# Patient Record
Sex: FEMALE | Race: WHITE | NOT HISPANIC OR LATINO | Employment: OTHER | URBAN - METROPOLITAN AREA
[De-identification: names, ages, dates, MRNs, and addresses within clinical notes are randomized per-mention and may not be internally consistent; named-entity substitution may affect disease eponyms.]

---

## 2017-02-27 ENCOUNTER — LAB (OUTPATIENT)
Dept: ENDOCRINOLOGY | Age: 57
End: 2017-02-27

## 2017-02-27 DIAGNOSIS — H05.89 THYROID ASSOCIATED OPHTHALMOPATHY: ICD-10-CM

## 2017-02-27 DIAGNOSIS — E07.9 THYROID ASSOCIATED OPHTHALMOPATHY: ICD-10-CM

## 2017-02-27 DIAGNOSIS — E05.00 FLAJANI DISEASE: ICD-10-CM

## 2017-02-27 DIAGNOSIS — E89.0 HYPOTHYROIDISM FOLLOWING RADIOIODINE THERAPY: ICD-10-CM

## 2017-02-27 DIAGNOSIS — E55.9 VITAMIN D DEFICIENCY: ICD-10-CM

## 2017-02-27 DIAGNOSIS — E87.6 DECREASED POTASSIUM IN THE BLOOD: ICD-10-CM

## 2017-02-27 DIAGNOSIS — E55.9 VITAMIN D DEFICIENCY: Primary | ICD-10-CM

## 2017-02-27 DIAGNOSIS — M81.0 OSTEOPOROSIS, POST-MENOPAUSAL: ICD-10-CM

## 2017-02-28 LAB — 25(OH)D3+25(OH)D2 SERPL-MCNC: 33.1 NG/ML (ref 30–100)

## 2017-03-02 LAB
ACTH PLAS-MCNC: 14 PG/ML (ref 7.2–63.3)
ALBUMIN SERPL-MCNC: 5.4 G/DL (ref 3.5–5.2)
ALBUMIN/GLOB SERPL: 2 G/DL
ALP SERPL-CCNC: 106 U/L (ref 39–117)
ALT SERPL-CCNC: 20 U/L (ref 1–33)
AST SERPL-CCNC: 27 U/L (ref 1–32)
BILIRUB SERPL-MCNC: 0.8 MG/DL (ref 0.1–1.2)
BUN SERPL-MCNC: 11 MG/DL (ref 6–20)
BUN/CREAT SERPL: 13.3 (ref 7–25)
CALCIUM SERPL-MCNC: 10 MG/DL (ref 8.6–10.5)
CHLORIDE SERPL-SCNC: 91 MMOL/L (ref 98–107)
CHOLEST SERPL-MCNC: 187 MG/DL (ref 0–200)
CO2 SERPL-SCNC: 27.7 MMOL/L (ref 22–29)
CORTIS SERPL-MCNC: 12.9 UG/DL
CREAT SERPL-MCNC: 0.83 MG/DL (ref 0.57–1)
FSH SERPL-ACNC: 62.7 MIU/ML
GLOBULIN SER CALC-MCNC: 2.7 GM/DL
GLUCOSE SERPL-MCNC: 72 MG/DL (ref 65–99)
HDLC SERPL-MCNC: 107 MG/DL (ref 40–60)
LDLC SERPL CALC-MCNC: 68 MG/DL (ref 0–100)
LH SERPL-ACNC: 33.7 MIU/ML
POTASSIUM SERPL-SCNC: 4 MMOL/L (ref 3.5–5.2)
PROT SERPL-MCNC: 8.1 G/DL (ref 6–8.5)
SODIUM SERPL-SCNC: 132 MMOL/L (ref 136–145)
T3FREE SERPL-MCNC: 2.4 PG/ML (ref 2–4.4)
T4 FREE SERPL-MCNC: 1.83 NG/DL (ref 0.93–1.7)
T4 SERPL-MCNC: 8 MCG/DL (ref 4.5–11.7)
TRIGL SERPL-MCNC: 62 MG/DL (ref 0–150)
TSH SERPL DL<=0.005 MIU/L-ACNC: 0.38 MIU/ML (ref 0.27–4.2)
TSI ACT/NOR SER: 526 % (ref 0–139)
URATE SERPL-MCNC: 5.4 MG/DL (ref 2.4–5.7)
VLDLC SERPL CALC-MCNC: 12.4 MG/DL (ref 5–40)

## 2017-03-13 ENCOUNTER — OFFICE VISIT (OUTPATIENT)
Dept: ENDOCRINOLOGY | Age: 57
End: 2017-03-13

## 2017-03-13 VITALS
HEIGHT: 64 IN | BODY MASS INDEX: 21.92 KG/M2 | RESPIRATION RATE: 16 BRPM | DIASTOLIC BLOOD PRESSURE: 80 MMHG | SYSTOLIC BLOOD PRESSURE: 124 MMHG | WEIGHT: 128.4 LBS

## 2017-03-13 DIAGNOSIS — E05.00 FLAJANI DISEASE: ICD-10-CM

## 2017-03-13 DIAGNOSIS — M81.0 OSTEOPOROSIS, POST-MENOPAUSAL: ICD-10-CM

## 2017-03-13 DIAGNOSIS — E05.90 HYPERTHYROIDISM: Primary | ICD-10-CM

## 2017-03-13 DIAGNOSIS — E89.0 HYPOTHYROIDISM FOLLOWING RADIOIODINE THERAPY: ICD-10-CM

## 2017-03-13 PROCEDURE — 99214 OFFICE O/P EST MOD 30 MIN: CPT | Performed by: INTERNAL MEDICINE

## 2017-03-13 RX ORDER — SERTRALINE HYDROCHLORIDE 100 MG/1
100 TABLET, FILM COATED ORAL DAILY
COMMUNITY
End: 2022-01-13

## 2017-03-13 RX ORDER — LEVOTHYROXINE SODIUM 0.1 MG/1
100 TABLET ORAL DAILY
Qty: 30 TABLET | Refills: 5 | Status: SHIPPED | OUTPATIENT
Start: 2017-03-13 | End: 2018-03-05 | Stop reason: SDUPTHER

## 2017-03-13 RX ORDER — ERGOCALCIFEROL 1.25 MG/1
50000 CAPSULE ORAL 2 TIMES WEEKLY
Qty: 26 CAPSULE | Refills: 3 | Status: SHIPPED | OUTPATIENT
Start: 2017-03-13 | End: 2018-03-13

## 2017-03-13 NOTE — PROGRESS NOTES
"Subjective   Valentine Sawyer is a 57 y.o. female seen for follow up for hypothyroidism, graves disease, goiter, lab review. Patient denies any problems or concerns. She states that since her last office visit she has been put on continuous oxygen. She is also currently taking amoxil for a chest cold. She was diagnosed with Osteoporosis in 01/2017.    History of Present Illness this is a 57-year-old female known patient with history of Graves' disease disease and thyrotoxicosis a status post ablation and consequent hypothyroidism as well as essential hypertension and vitamin D deficiency.  Over the course of last the 6 months she has had no significant health problems however recently she has been diagnosed with post menopausal osteoporosi however she is not on any specific treatment and using the calcium and vitamin D supplements over-the-counter.  Visit Vitals   • /80   • Resp 16   • Ht 64\" (162.6 cm)   • Wt 128 lb 6.4 oz (58.2 kg)   • BMI 22.04 kg/m2     No Known Allergies    Current Outpatient Prescriptions:   •  albuterol (PROAIR RESPICLICK) 108 (90 BASE) MCG/ACT inhaler, Inhale Every 4 (Four) Hours As Needed for Wheezing., Disp: , Rfl:   •  aspirin 81 MG tablet, Take 81 mg by mouth daily., Disp: , Rfl:   •  bumetanide (BUMEX) 2 MG tablet, Take 2 mg by mouth daily., Disp: , Rfl:   •  CHANTIX 0.5 MG tablet, Take 1 tablet by mouth 2 (two) times a day., Disp: 60 tablet, Rfl: 0  •  CHANTIX CONTINUING MONTH RAHUL 1 MG tablet, Take 1 tablet by mouth 2 (two) times a day., Disp: 60 tablet, Rfl: 2  •  Fluticasone Furoate-Vilanterol (BREO ELLIPTA) 100-25 MCG/INH aerosol powder , Inhale., Disp: , Rfl:   •  levothyroxine (SYNTHROID, LEVOTHROID) 100 MCG tablet, Take 1 tablet by mouth daily., Disp: 30 tablet, Rfl: 5  •  lisinopril (PRINIVIL,ZESTRIL) 20 MG tablet, Take 1 tablet by mouth daily., Disp: 30 tablet, Rfl: 5  •  metoprolol succinate XL (TOPROL-XL) 50 MG 24 hr tablet, Take 50 mg by mouth 2 (two) times a day., Disp: " , Rfl:   •  potassium chloride (KLOR-CON) 8 MEQ CR tablet, Take 8 mEq by mouth 2 (two) times a day., Disp: , Rfl:   •  sertraline (ZOLOFT) 100 MG tablet, Take 100 mg by mouth Daily., Disp: , Rfl:   •  Umeclidinium Bromide (INCRUSE ELLIPTA) 62.5 MCG/INH aerosol powder , Inhale., Disp: , Rfl:       The following portions of the patient's history were reviewed and updated as appropriate: allergies, current medications, past family history, past medical history, past social history, past surgical history and problem list.    Review of Systems   Constitutional: Negative.    HENT: Negative.    Eyes: Negative.    Respiratory: Negative.    Cardiovascular: Negative.    Gastrointestinal: Negative.    Endocrine: Negative.    Genitourinary: Negative.    Musculoskeletal: Negative.    Skin: Negative.    Allergic/Immunologic: Negative.    Neurological: Negative.    Hematological: Negative.    Psychiatric/Behavioral: Negative.        Objective   Physical Exam   Constitutional: She is oriented to person, place, and time. She appears well-developed and well-nourished. No distress.   Chronic the ill and tired looking.   HENT:   Head: Normocephalic and atraumatic.   Right Ear: External ear normal.   Left Ear: External ear normal.   Nose: Nose normal.   Mouth/Throat: Oropharynx is clear and moist. No oropharyngeal exudate.   Eyes: Conjunctivae and EOM are normal. Pupils are equal, round, and reactive to light. Right eye exhibits no discharge. Left eye exhibits no discharge. No scleral icterus.   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   Neck: Normal range of motion. Neck supple. No JVD present. No tracheal deviation present. No thyromegaly present.   Cardiovascular: Normal rate, regular rhythm, normal heart sounds and intact distal pulses.  Exam reveals no gallop and no friction rub.    No murmur heard.  Pulmonary/Chest: Effort normal and breath sounds normal. No stridor. No respiratory distress. She has no wheezes. She has  no rales. She exhibits no tenderness.   Abdominal: Soft. Bowel sounds are normal. She exhibits no distension and no mass. There is no tenderness. There is no rebound and no guarding. No hernia.   Musculoskeletal: Normal range of motion. She exhibits no edema, tenderness or deformity.   Lymphadenopathy:     She has no cervical adenopathy.   Neurological: She is alert and oriented to person, place, and time. She has normal reflexes. She displays normal reflexes. No cranial nerve deficit. She exhibits normal muscle tone. Coordination normal.   Skin: Skin is warm and dry. No rash noted. She is not diaphoretic. No erythema. No pallor.   Psychiatric: She has a normal mood and affect. Her behavior is normal. Judgment and thought content normal.   Nursing note and vitals reviewed.    Results for orders placed or performed in visit on 02/27/17   Uric acid   Result Value Ref Range    Uric Acid 5.4 2.4 - 5.7 mg/dL   T4, free   Result Value Ref Range    Free T4 1.83 (H) 0.93 - 1.70 ng/dL   T4 and TSH ([facility] Only)   Result Value Ref Range    TSH 0.378 0.270 - 4.200 mIU/mL    T4, Total 8.00 4.50 - 11.70 mcg/dL   T3, free   Result Value Ref Range    T3, Free 2.4 2.0 - 4.4 pg/mL   Comprehensive metabolic panel   Result Value Ref Range    Glucose 72 65 - 99 mg/dL    BUN 11 6 - 20 mg/dL    Creatinine 0.83 0.57 - 1.00 mg/dL    eGFR Non African Am 71 >60 mL/min/1.73    eGFR African Am 86 >60 mL/min/1.73    BUN/Creatinine Ratio 13.3 7.0 - 25.0    Sodium 132 (L) 136 - 145 mmol/L    Potassium 4.0 3.5 - 5.2 mmol/L    Chloride 91 (L) 98 - 107 mmol/L    Total CO2 27.7 22.0 - 29.0 mmol/L    Calcium 10.0 8.6 - 10.5 mg/dL    Total Protein 8.1 6.0 - 8.5 g/dL    Albumin 5.40 (H) 3.50 - 5.20 g/dL    Globulin 2.7 gm/dL    A/G Ratio 2.0 g/dL    Total Bilirubin 0.8 0.1 - 1.2 mg/dL    Alkaline Phosphatase 106 39 - 117 U/L    AST (SGOT) 27 1 - 32 U/L    ALT (SGPT) 20 1 - 33 U/L   Follicle stimulating hormone   Result Value Ref Range    FSH 62.7  mIU/mL   Lipid panel   Result Value Ref Range    Total Cholesterol 187 0 - 200 mg/dL    Triglycerides 62 0 - 150 mg/dL    HDL Cholesterol 107 (H) 40 - 60 mg/dL    VLDL Cholesterol 12.4 5 - 40 mg/dL    LDL Cholesterol  68 0 - 100 mg/dL   Luteinizing hormone   Result Value Ref Range    LH 33.7 mIU/mL   ACTH   Result Value Ref Range    ACTH 14.0 7.2 - 63.3 pg/mL   Cortisol   Result Value Ref Range    Cortisol 12.9 ug/dL   Thyroid stimulating immunoglobulin   Result Value Ref Range    Thyroid Stimulating Immunoglobulin 526 (H) 0 - 139 %   Vitamin D 25 Hydroxy   Result Value Ref Range    25 Hydroxy, Vitamin D 33.1 30.0 - 100.0 ng/mL         Assessment/Plan   Diagnoses and all orders for this visit:    Hyperthyroidism  -     T3, Free; Future  -     T4 & TSH ([facility]); Future  -     T4, Free; Future  -     Uric Acid; Future  -     Vitamin D 25 Hydroxy; Future  -     Comprehensive Metabolic Panel; Future  -     Lipid Panel; Future  -     Thyroid Stimulating Immunoglobulin; Future    Hypothyroidism following radioiodine therapy  -     T3, Free; Future  -     T4 & TSH ([facility]); Future  -     T4, Free; Future  -     Uric Acid; Future  -     Vitamin D 25 Hydroxy; Future  -     Comprehensive Metabolic Panel; Future  -     Lipid Panel; Future  -     Thyroid Stimulating Immunoglobulin; Future    Osteoporosis, post-menopausal  -     T3, Free; Future  -     T4 & TSH ([facility]); Future  -     T4, Free; Future  -     Uric Acid; Future  -     Vitamin D 25 Hydroxy; Future  -     Comprehensive Metabolic Panel; Future  -     Lipid Panel; Future  -     Thyroid Stimulating Immunoglobulin; Future    Flajani disease  -     T3, Free; Future  -     T4 & TSH ([facility]); Future  -     T4, Free; Future  -     Uric Acid; Future  -     Vitamin D 25 Hydroxy; Future  -     Comprehensive Metabolic Panel; Future  -     Lipid Panel; Future  -     Thyroid Stimulating Immunoglobulin; Future    Other orders  -     levothyroxine (SYNTHROID, LEVOTHROID) 100  MCG tablet; Take 1 tablet by mouth Daily.  -     ergocalciferol (DRISDOL) 96520 UNITS capsule; Take 1 capsule by mouth 2 (Two) Times a Week.               His summary I saw and examined this 57-year-old female for above-mentioned problems.  I reviewed with her laboratory evaluation of 02/27/2017 and provided her with a hard copy of it.  She is overall clinically and metabolically stable however her vitamin D level still is on the low side and therefore am going to increase her dose to 50,000 units twice weekly.  She wishes to be follicle mostly by her primary care provider and as such I will see her once a year.

## 2017-05-09 RX ORDER — LISINOPRIL 20 MG/1
TABLET ORAL
Qty: 30 TABLET | Refills: 4 | Status: SHIPPED | OUTPATIENT
Start: 2017-05-09 | End: 2019-02-08 | Stop reason: CLARIF

## 2018-03-01 ENCOUNTER — RESULTS ENCOUNTER (OUTPATIENT)
Dept: ENDOCRINOLOGY | Age: 58
End: 2018-03-01

## 2018-03-01 DIAGNOSIS — E89.0 HYPOTHYROIDISM FOLLOWING RADIOIODINE THERAPY: ICD-10-CM

## 2018-03-01 DIAGNOSIS — E05.00 FLAJANI DISEASE: ICD-10-CM

## 2018-03-01 DIAGNOSIS — E05.90 HYPERTHYROIDISM: ICD-10-CM

## 2018-03-01 DIAGNOSIS — M81.0 OSTEOPOROSIS, POST-MENOPAUSAL: ICD-10-CM

## 2018-03-01 LAB
25(OH)D3+25(OH)D2 SERPL-MCNC: 80.5 NG/ML (ref 30–100)
ALBUMIN SERPL-MCNC: 5.2 G/DL (ref 3.5–5.2)
ALBUMIN/GLOB SERPL: 1.8 G/DL
ALP SERPL-CCNC: 107 U/L (ref 39–117)
ALT SERPL-CCNC: 15 U/L (ref 1–33)
AST SERPL-CCNC: 26 U/L (ref 1–32)
BILIRUB SERPL-MCNC: 0.9 MG/DL (ref 0.1–1.2)
BUN SERPL-MCNC: 12 MG/DL (ref 6–20)
BUN/CREAT SERPL: 13.3 (ref 7–25)
CALCIUM SERPL-MCNC: 9.8 MG/DL (ref 8.6–10.5)
CHLORIDE SERPL-SCNC: 89 MMOL/L (ref 98–107)
CHOLEST SERPL-MCNC: 208 MG/DL (ref 0–200)
CO2 SERPL-SCNC: 26.6 MMOL/L (ref 22–29)
CREAT SERPL-MCNC: 0.9 MG/DL (ref 0.57–1)
GFR SERPLBLD CREATININE-BSD FMLA CKD-EPI: 64 ML/MIN/1.73
GFR SERPLBLD CREATININE-BSD FMLA CKD-EPI: 78 ML/MIN/1.73
GLOBULIN SER CALC-MCNC: 2.9 GM/DL
GLUCOSE SERPL-MCNC: 90 MG/DL (ref 65–99)
HDLC SERPL-MCNC: 109 MG/DL (ref 40–60)
INTERPRETATION: NORMAL
LDLC SERPL CALC-MCNC: 87 MG/DL (ref 0–100)
POTASSIUM SERPL-SCNC: 3.9 MMOL/L (ref 3.5–5.2)
PROT SERPL-MCNC: 8.1 G/DL (ref 6–8.5)
SODIUM SERPL-SCNC: 131 MMOL/L (ref 136–145)
T3FREE SERPL-MCNC: 2.7 PG/ML (ref 2–4.4)
T4 FREE SERPL-MCNC: 1.83 NG/DL (ref 0.93–1.7)
T4 SERPL-MCNC: 8.17 MCG/DL (ref 4.5–11.7)
TRIGL SERPL-MCNC: 60 MG/DL (ref 0–150)
TSH SERPL DL<=0.005 MIU/L-ACNC: 0.68 MIU/ML (ref 0.27–4.2)
TSI ACT/NOR SER: 1.82 IU/L (ref 0–0.55)
URATE SERPL-MCNC: 5.3 MG/DL (ref 2.4–5.7)
VLDLC SERPL CALC-MCNC: 12 MG/DL (ref 5–40)

## 2018-03-06 RX ORDER — LEVOTHYROXINE SODIUM 0.1 MG/1
TABLET ORAL
Qty: 30 TABLET | Refills: 0 | Status: SHIPPED | OUTPATIENT
Start: 2018-03-06 | End: 2018-04-09 | Stop reason: SDUPTHER

## 2018-04-09 RX ORDER — LEVOTHYROXINE SODIUM 0.1 MG/1
TABLET ORAL
Qty: 30 TABLET | Refills: 0 | Status: SHIPPED | OUTPATIENT
Start: 2018-04-09 | End: 2018-05-15 | Stop reason: SDUPTHER

## 2018-05-15 RX ORDER — LEVOTHYROXINE SODIUM 0.1 MG/1
TABLET ORAL
Qty: 30 TABLET | Refills: 0 | Status: SHIPPED | OUTPATIENT
Start: 2018-05-15 | End: 2018-06-21 | Stop reason: SDUPTHER

## 2018-06-21 RX ORDER — LEVOTHYROXINE SODIUM 0.1 MG/1
TABLET ORAL
Qty: 30 TABLET | Refills: 0 | Status: SHIPPED | OUTPATIENT
Start: 2018-06-21 | End: 2018-07-30 | Stop reason: SDUPTHER

## 2018-07-31 RX ORDER — LEVOTHYROXINE SODIUM 0.1 MG/1
TABLET ORAL
Qty: 30 TABLET | Refills: 0 | Status: SHIPPED | OUTPATIENT
Start: 2018-07-31 | End: 2018-09-10 | Stop reason: SDUPTHER

## 2018-09-11 RX ORDER — LEVOTHYROXINE SODIUM 0.1 MG/1
TABLET ORAL
Qty: 30 TABLET | Refills: 0 | Status: SHIPPED | OUTPATIENT
Start: 2018-09-11 | End: 2018-10-22 | Stop reason: SDUPTHER

## 2018-09-13 DIAGNOSIS — E87.6 DECREASED POTASSIUM IN THE BLOOD: ICD-10-CM

## 2018-09-13 DIAGNOSIS — E07.9 THYROID ASSOCIATED OPHTHALMOPATHY: ICD-10-CM

## 2018-09-13 DIAGNOSIS — E05.00 FLAJANI DISEASE: Primary | ICD-10-CM

## 2018-09-13 DIAGNOSIS — H05.89 THYROID ASSOCIATED OPHTHALMOPATHY: ICD-10-CM

## 2018-09-13 DIAGNOSIS — E89.0 HYPOTHYROIDISM FOLLOWING RADIOIODINE THERAPY: ICD-10-CM

## 2018-09-13 DIAGNOSIS — E05.90 HYPERTHYROIDISM: ICD-10-CM

## 2018-10-13 ENCOUNTER — RESULTS ENCOUNTER (OUTPATIENT)
Dept: ENDOCRINOLOGY | Age: 58
End: 2018-10-13

## 2018-10-13 DIAGNOSIS — E07.9 THYROID ASSOCIATED OPHTHALMOPATHY: ICD-10-CM

## 2018-10-13 DIAGNOSIS — H05.89 THYROID ASSOCIATED OPHTHALMOPATHY: ICD-10-CM

## 2018-10-13 DIAGNOSIS — E87.6 DECREASED POTASSIUM IN THE BLOOD: ICD-10-CM

## 2018-10-13 DIAGNOSIS — E89.0 HYPOTHYROIDISM FOLLOWING RADIOIODINE THERAPY: ICD-10-CM

## 2018-10-13 DIAGNOSIS — E05.90 HYPERTHYROIDISM: ICD-10-CM

## 2018-10-13 DIAGNOSIS — E05.00 FLAJANI DISEASE: ICD-10-CM

## 2018-10-15 RX ORDER — LEVOTHYROXINE SODIUM 0.1 MG/1
TABLET ORAL
Qty: 30 TABLET | Refills: 0 | OUTPATIENT
Start: 2018-10-15

## 2018-10-22 RX ORDER — LEVOTHYROXINE SODIUM 0.1 MG/1
100 TABLET ORAL DAILY
Qty: 30 TABLET | Refills: 0 | Status: SHIPPED | OUTPATIENT
Start: 2018-10-22 | End: 2018-12-01 | Stop reason: SDUPTHER

## 2018-11-26 RX ORDER — LEVOTHYROXINE SODIUM 0.1 MG/1
TABLET ORAL
Qty: 30 TABLET | Refills: 0 | OUTPATIENT
Start: 2018-11-26

## 2018-12-03 RX ORDER — LEVOTHYROXINE SODIUM 0.1 MG/1
TABLET ORAL
Qty: 30 TABLET | Refills: 0 | Status: SHIPPED | OUTPATIENT
Start: 2018-12-03 | End: 2019-01-04 | Stop reason: SDUPTHER

## 2019-01-09 RX ORDER — LEVOTHYROXINE SODIUM 0.1 MG/1
TABLET ORAL
Qty: 30 TABLET | Refills: 0 | Status: SHIPPED | OUTPATIENT
Start: 2019-01-09 | End: 2019-02-08

## 2019-01-31 LAB
25(OH)D3+25(OH)D2 SERPL-MCNC: 36 NG/ML (ref 30–100)
ACTH PLAS-MCNC: 30.7 PG/ML (ref 7.2–63.3)
ALBUMIN SERPL-MCNC: 5.1 G/DL (ref 3.5–5.2)
ALBUMIN/GLOB SERPL: 1.7 G/DL
ALP SERPL-CCNC: 94 U/L (ref 39–117)
ALT SERPL-CCNC: 10 U/L (ref 1–33)
AST SERPL-CCNC: 17 U/L (ref 1–32)
BILIRUB SERPL-MCNC: 0.5 MG/DL (ref 0.1–1.2)
BUN SERPL-MCNC: 9 MG/DL (ref 6–20)
BUN/CREAT SERPL: 11.7 (ref 7–25)
CALCIUM SERPL-MCNC: 9.9 MG/DL (ref 8.6–10.5)
CHLORIDE SERPL-SCNC: 83 MMOL/L (ref 98–107)
CHOLEST SERPL-MCNC: 187 MG/DL (ref 0–200)
CO2 SERPL-SCNC: 24.5 MMOL/L (ref 22–29)
CORTIS SERPL-MCNC: 12 UG/DL
CREAT SERPL-MCNC: 0.77 MG/DL (ref 0.57–1)
FSH SERPL-ACNC: 62.3 MIU/ML
GLOBULIN SER CALC-MCNC: 3 GM/DL
GLUCOSE SERPL-MCNC: 73 MG/DL (ref 65–99)
HDLC SERPL-MCNC: 104 MG/DL (ref 40–60)
INTERPRETATION: NORMAL
LDLC SERPL CALC-MCNC: 58 MG/DL (ref 0–100)
LH SERPL-ACNC: 30.6 MIU/ML
POTASSIUM SERPL-SCNC: 4.6 MMOL/L (ref 3.5–5.2)
PROT SERPL-MCNC: 8.1 G/DL (ref 6–8.5)
SODIUM SERPL-SCNC: 125 MMOL/L (ref 136–145)
T3FREE SERPL-MCNC: 2.3 PG/ML (ref 2–4.4)
T4 FREE SERPL-MCNC: 1.67 NG/DL (ref 0.93–1.7)
T4 SERPL-MCNC: 7.75 MCG/DL (ref 4.5–11.7)
THYROGLOB AB SERPL-ACNC: 5.4 IU/ML
THYROGLOB SERPL-MCNC: 6.1 NG/ML
THYROGLOB SERPL-MCNC: ABNORMAL NG/ML
TRIGL SERPL-MCNC: 124 MG/DL (ref 0–150)
TSH SERPL DL<=0.005 MIU/L-ACNC: 2.26 MIU/ML (ref 0.27–4.2)
URATE SERPL-MCNC: 4.8 MG/DL (ref 2.4–5.7)
VLDLC SERPL CALC-MCNC: 24.8 MG/DL (ref 5–40)

## 2019-02-08 ENCOUNTER — OFFICE VISIT (OUTPATIENT)
Dept: ENDOCRINOLOGY | Age: 59
End: 2019-02-08

## 2019-02-08 VITALS
HEIGHT: 64 IN | DIASTOLIC BLOOD PRESSURE: 78 MMHG | SYSTOLIC BLOOD PRESSURE: 126 MMHG | BODY MASS INDEX: 23.08 KG/M2 | RESPIRATION RATE: 16 BRPM | WEIGHT: 135.2 LBS

## 2019-02-08 DIAGNOSIS — E05.00 FLAJANI DISEASE: ICD-10-CM

## 2019-02-08 DIAGNOSIS — E89.0 HYPOTHYROIDISM FOLLOWING RADIOIODINE THERAPY: Primary | ICD-10-CM

## 2019-02-08 DIAGNOSIS — H05.89 THYROID ASSOCIATED OPHTHALMOPATHY: ICD-10-CM

## 2019-02-08 DIAGNOSIS — M81.0 OSTEOPOROSIS, POST-MENOPAUSAL: ICD-10-CM

## 2019-02-08 DIAGNOSIS — E05.90 HYPERTHYROIDISM: ICD-10-CM

## 2019-02-08 DIAGNOSIS — E07.9 THYROID ASSOCIATED OPHTHALMOPATHY: ICD-10-CM

## 2019-02-08 DIAGNOSIS — E87.1 HYPONATREMIA: ICD-10-CM

## 2019-02-08 PROCEDURE — 99214 OFFICE O/P EST MOD 30 MIN: CPT | Performed by: INTERNAL MEDICINE

## 2019-02-08 RX ORDER — MONTELUKAST SODIUM 10 MG/1
10 TABLET ORAL NIGHTLY
COMMUNITY

## 2019-02-08 RX ORDER — LEVOTHYROXINE SODIUM 0.05 MG/1
50 TABLET ORAL DAILY
Qty: 90 TABLET | Refills: 3 | Status: SHIPPED | OUTPATIENT
Start: 2019-02-08 | End: 2019-02-08

## 2019-02-08 RX ORDER — LOSARTAN POTASSIUM 25 MG/1
25 TABLET ORAL DAILY
COMMUNITY
End: 2022-01-13

## 2019-02-08 RX ORDER — LEVOTHYROXINE SODIUM 112 UG/1
112 TABLET ORAL DAILY
Qty: 90 TABLET | Refills: 3 | Status: SHIPPED | OUTPATIENT
Start: 2019-02-08 | End: 2019-08-26 | Stop reason: SDUPTHER

## 2019-02-08 NOTE — PROGRESS NOTES
"Subjective   Valentine Sawyer is a 59 y.o. female seen for follow up for goiter, hypothyroidism, graves, lab review. She states that she has had 2 falls since her last visit. She hurt her back and broke her wrist. She states that now she is having sciatic pain. She denies any other problems or concerns.     History of Present Illness this is a 59-year-old female known patient with history of Graves' disease and off tomorrow but the as well as post radioactive iodine therapy hypothyroidism.  She is complaining of pain in her sciatic area which makes even movement a very difficult action.  She has had no significant health problem for which to go to the emergency room or hospital.    /78   Resp 16   Ht 162.6 cm (64\")   Wt 61.3 kg (135 lb 3.2 oz)   BMI 23.21 kg/m²      No Known Allergies    Current Outpatient Medications:   •  albuterol (PROAIR RESPICLICK) 108 (90 BASE) MCG/ACT inhaler, Inhale Every 4 (Four) Hours As Needed for Wheezing., Disp: , Rfl:   •  aspirin 81 MG tablet, Take 81 mg by mouth daily., Disp: , Rfl:   •  bumetanide (BUMEX) 2 MG tablet, Take 2 mg by mouth daily., Disp: , Rfl:   •  Fluticasone Furoate-Vilanterol (BREO ELLIPTA) 100-25 MCG/INH aerosol powder , Inhale., Disp: , Rfl:   •  levothyroxine (SYNTHROID, LEVOTHROID) 100 MCG tablet, TAKE ONE TABLET BY MOUTH DAILY, Disp: 30 tablet, Rfl: 0  •  losartan (COZAAR) 25 MG tablet, Take 25 mg by mouth Daily., Disp: , Rfl:   •  metoprolol succinate XL (TOPROL-XL) 50 MG 24 hr tablet, Take 50 mg by mouth 2 (two) times a day., Disp: , Rfl:   •  montelukast (SINGULAIR) 10 MG tablet, Take 10 mg by mouth Every Night., Disp: , Rfl:   •  sertraline (ZOLOFT) 100 MG tablet, Take 100 mg by mouth Daily., Disp: , Rfl:   •  Umeclidinium Bromide (INCRUSE ELLIPTA) 62.5 MCG/INH aerosol powder , Inhale., Disp: , Rfl:       The following portions of the patient's history were reviewed and updated as appropriate: allergies, current medications, past family history, " past medical history, past social history, past surgical history and problem list.    Review of Systems   Constitutional: Negative.    HENT: Negative.    Eyes: Negative.    Respiratory: Negative.    Cardiovascular: Negative.    Gastrointestinal: Negative.    Endocrine: Negative.    Genitourinary: Negative.    Musculoskeletal: Negative.    Skin: Negative.    Allergic/Immunologic: Negative.    Neurological: Negative.    Hematological: Negative.    Psychiatric/Behavioral: Negative.        Objective   Physical Exam   Constitutional: She is oriented to person, place, and time. She appears well-developed and well-nourished. No distress.   Chronic the ill and tired looking.   HENT:   Head: Normocephalic and atraumatic.   Right Ear: External ear normal.   Left Ear: External ear normal.   Nose: Nose normal.   Mouth/Throat: Oropharynx is clear and moist. No oropharyngeal exudate.   Eyes: Conjunctivae and EOM are normal. Pupils are equal, round, and reactive to light. Right eye exhibits no discharge. Left eye exhibits no discharge. No scleral icterus.   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   Neck: Normal range of motion. Neck supple. No JVD present. No tracheal deviation present. No thyromegaly present.   Cardiovascular: Normal rate, regular rhythm, normal heart sounds and intact distal pulses. Exam reveals no gallop and no friction rub.   No murmur heard.  Pulmonary/Chest: Effort normal and breath sounds normal. No stridor. No respiratory distress. She has no wheezes. She has no rales. She exhibits no tenderness.   Abdominal: Soft. Bowel sounds are normal. She exhibits no distension and no mass. There is no tenderness. There is no rebound and no guarding. No hernia.   Musculoskeletal: Normal range of motion. She exhibits no edema, tenderness or deformity.   Lymphadenopathy:     She has no cervical adenopathy.   Neurological: She is alert and oriented to person, place, and time. She has normal reflexes. She  displays normal reflexes. No cranial nerve deficit or sensory deficit. She exhibits normal muscle tone. Coordination normal.   Skin: Skin is warm and dry. No rash noted. She is not diaphoretic. No erythema. No pallor.   Psychiatric: She has a normal mood and affect. Her behavior is normal. Judgment and thought content normal.   Nursing note and vitals reviewed.       Results for orders placed or performed in visit on 10/13/18   ACTH   Result Value Ref Range    ACTH 30.7 7.2 - 63.3 pg/mL   Cortisol   Result Value Ref Range    Cortisol 12.0 ug/dL   Follicle Stimulating Hormone   Result Value Ref Range    FSH 62.3 mIU/mL   Luteinizing Hormone   Result Value Ref Range    LH 30.6 mIU/mL   Comprehensive Metabolic Panel   Result Value Ref Range    Glucose 73 65 - 99 mg/dL    BUN 9 6 - 20 mg/dL    Creatinine 0.77 0.57 - 1.00 mg/dL    eGFR Non African Am 77 >60 mL/min/1.73    eGFR African Am 93 >60 mL/min/1.73    BUN/Creatinine Ratio 11.7 7.0 - 25.0    Sodium 125 (L) 136 - 145 mmol/L    Potassium 4.6 3.5 - 5.2 mmol/L    Chloride 83 (L) 98 - 107 mmol/L    Total CO2 24.5 22.0 - 29.0 mmol/L    Calcium 9.9 8.6 - 10.5 mg/dL    Total Protein 8.1 6.0 - 8.5 g/dL    Albumin 5.10 3.50 - 5.20 g/dL    Globulin 3.0 gm/dL    A/G Ratio 1.7 g/dL    Total Bilirubin 0.5 0.1 - 1.2 mg/dL    Alkaline Phosphatase 94 39 - 117 U/L    AST (SGOT) 17 1 - 32 U/L    ALT (SGPT) 10 1 - 33 U/L   Comprehensive Thyroglobulin   Result Value Ref Range    Thyroglobulin Ab 5.4 (H) IU/mL    Thyroglobulin Comment ng/mL    Thyroglobulin (TG-CHAY) 6.1 ng/mL   Lipid Panel   Result Value Ref Range    Total Cholesterol 187 0 - 200 mg/dL    Triglycerides 124 0 - 150 mg/dL    HDL Cholesterol 104 (H) 40 - 60 mg/dL    VLDL Cholesterol 24.8 5 - 40 mg/dL    LDL Cholesterol  58 0 - 100 mg/dL   Uric Acid   Result Value Ref Range    Uric Acid 4.8 2.4 - 5.7 mg/dL   Vitamin D 25 Hydroxy   Result Value Ref Range    25 Hydroxy, Vitamin D 36.0 30.0 - 100.0 ng/ml   T4, Free   Result  Value Ref Range    Free T4 1.67 0.93 - 1.70 ng/dL   T4 & TSH ([facility])   Result Value Ref Range    TSH 2.260 0.270 - 4.200 mIU/mL    T4, Total 7.75 4.50 - 11.70 mcg/dL   T3, Free   Result Value Ref Range    T3, Free 2.3 2.0 - 4.4 pg/mL   Cardiovascular Risk Assessment   Result Value Ref Range    Interpretation Note          Assessment/Plan   Diagnoses and all orders for this visit:    Hypothyroidism following radioiodine therapy  -     T3, Free; Future  -     T4 & TSH ([facility]); Future  -     T4, Free; Future  -     Uric Acid; Future  -     Vitamin D 25 Hydroxy; Future  -     Comprehensive Metabolic Panel; Future  -     Lipid Panel; Future  -     Thyroid Stimulating Immunoglobulin; Future  -     ACTH; Future  -     Cortisol; Future  -     Aldosterone; Future  -     Osmolality, Serum; Future  -     Osmolality, Urine - Urine, Clean Catch; Future    Hyperthyroidism  -     T3, Free; Future  -     T4 & TSH ([facility]); Future  -     T4, Free; Future  -     Uric Acid; Future  -     Vitamin D 25 Hydroxy; Future  -     Comprehensive Metabolic Panel; Future  -     Lipid Panel; Future  -     Thyroid Stimulating Immunoglobulin; Future  -     ACTH; Future  -     Cortisol; Future  -     Aldosterone; Future  -     Osmolality, Serum; Future  -     Osmolality, Urine - Urine, Clean Catch; Future    Flajani disease  -     T3, Free; Future  -     T4 & TSH ([facility]); Future  -     T4, Free; Future  -     Uric Acid; Future  -     Vitamin D 25 Hydroxy; Future  -     Comprehensive Metabolic Panel; Future  -     Lipid Panel; Future  -     Thyroid Stimulating Immunoglobulin; Future  -     ACTH; Future  -     Cortisol; Future  -     Aldosterone; Future  -     Osmolality, Serum; Future  -     Osmolality, Urine - Urine, Clean Catch; Future    Osteoporosis, post-menopausal  -     T3, Free; Future  -     T4 & TSH ([facility]); Future  -     T4, Free; Future  -     Uric Acid; Future  -     Vitamin D 25 Hydroxy; Future  -     Comprehensive Metabolic  Panel; Future  -     Lipid Panel; Future  -     Thyroid Stimulating Immunoglobulin; Future  -     ACTH; Future  -     Cortisol; Future  -     Aldosterone; Future  -     Osmolality, Serum; Future  -     Osmolality, Urine - Urine, Clean Catch; Future    Thyroid associated ophthalmopathy  -     T3, Free; Future  -     T4 & TSH ([facility]); Future  -     T4, Free; Future  -     Uric Acid; Future  -     Vitamin D 25 Hydroxy; Future  -     Comprehensive Metabolic Panel; Future  -     Lipid Panel; Future  -     Thyroid Stimulating Immunoglobulin; Future  -     ACTH; Future  -     Cortisol; Future  -     Aldosterone; Future  -     Osmolality, Serum; Future  -     Osmolality, Urine - Urine, Clean Catch; Future    Hyponatremia  -     ACTH; Future  -     Cortisol; Future  -     Aldosterone; Future  -     Osmolality, Serum; Future  -     Osmolality, Urine - Urine, Clean Catch; Future    Other orders  -     Discontinue: levothyroxine (SYNTHROID) 50 MCG tablet; Take 1 tablet by mouth Daily.  -     levothyroxine (SYNTHROID) 112 MCG tablet; Take 1 tablet by mouth Daily. On empty stomach               In summary I saw and examined this 59-year-old female for above-mentioned problems.  I reviewed her laboratory evaluation of 01/25/2019 and provided her with a hard copy of it.  Her FSH LH is and menopausal arrange however she is a former smoker as well as her mother had breast cancer and therefore she is not a candidate for hormone replacement therapy.  Overall she is clinically and metabolically stable and therefore we will go ahead and continue all her current prescriptions.  I will however increase her Synthroid to 112 µg daily.  She will see Ms. Regla Mahmood in 6 months or sooner if needed with laboratory evaluation prior to each office visit.

## 2019-02-12 ENCOUNTER — TELEPHONE (OUTPATIENT)
Dept: ENDOCRINOLOGY | Age: 59
End: 2019-02-12

## 2019-02-12 NOTE — TELEPHONE ENCOUNTER
----- Message from Aristides Armstrong MD sent at 2/11/2019  2:54 PM EST -----  Contact: [phone]  I did switch her to Synthroid 112 µg daily and is stopped the 100 µg daily.  ----- Message -----  From: Viky Sheridan MA  Sent: 2/11/2019  12:49 PM  To: Aristides Armstrong MD     Which dose is correct?  ----- Message -----  From: Ayala Alexander  Sent: 2/11/2019   8:42 AM  To: Viky Sheridan MA    levothyroxine (SYNTHROID) 112 MCG tablet    The above was sent over on Friday    However the patient states that brando told her to keep it the same which was 100    Please call patient to verify    She is expecting a return call    You can leave a message if she doesn't answer    She did not pick it up at the pharmacy        Spoke to patient. She expressed understanding.

## 2019-07-05 ENCOUNTER — TELEPHONE (OUTPATIENT)
Dept: ENDOCRINOLOGY | Age: 59
End: 2019-07-05

## 2019-07-05 NOTE — TELEPHONE ENCOUNTER
----- Message from Aristides Armstrong MD sent at 7/5/2019 10:59 AM EDT -----  Contact: PATIENT  I have no objection and is totally okay  ----- Message -----  From: Viky Sheridan MA  Sent: 7/5/2019  10:36 AM  To: Aristides Armstrong MD        ----- Message -----  From: Ayala Alexander  Sent: 7/5/2019   9:48 AM  To: Viky Sheridan MA    NEEDS TO KNOW IF IT IS OK IF SHE HAS AN EPIDUREAL BLOCK IN THE BACK    PAIN MANAGEMENT [facility] SUGGEST SHE HAS IT    IS IT OK FOR HER TO DO     PLEASE CALL PATIENT [phone]    SPOKE TO PATIENT. SHE EXPRESSED UNDERSTANDING. 07/05/2019

## 2019-07-30 ENCOUNTER — RESULTS ENCOUNTER (OUTPATIENT)
Dept: ENDOCRINOLOGY | Age: 59
End: 2019-07-30

## 2019-07-30 DIAGNOSIS — E87.1 HYPONATREMIA: ICD-10-CM

## 2019-07-30 DIAGNOSIS — E89.0 HYPOTHYROIDISM FOLLOWING RADIOIODINE THERAPY: ICD-10-CM

## 2019-07-30 DIAGNOSIS — M81.0 OSTEOPOROSIS, POST-MENOPAUSAL: ICD-10-CM

## 2019-07-30 DIAGNOSIS — H05.89 THYROID ASSOCIATED OPHTHALMOPATHY: ICD-10-CM

## 2019-07-30 DIAGNOSIS — E05.00 FLAJANI DISEASE: ICD-10-CM

## 2019-07-30 DIAGNOSIS — E05.90 HYPERTHYROIDISM: ICD-10-CM

## 2019-07-30 DIAGNOSIS — E07.9 THYROID ASSOCIATED OPHTHALMOPATHY: ICD-10-CM

## 2019-07-31 ENCOUNTER — RESULTS ENCOUNTER (OUTPATIENT)
Dept: ENDOCRINOLOGY | Age: 59
End: 2019-07-31

## 2019-07-31 DIAGNOSIS — H05.89 THYROID ASSOCIATED OPHTHALMOPATHY: ICD-10-CM

## 2019-07-31 DIAGNOSIS — E05.00 FLAJANI DISEASE: ICD-10-CM

## 2019-07-31 DIAGNOSIS — E05.90 HYPERTHYROIDISM: ICD-10-CM

## 2019-07-31 DIAGNOSIS — M81.0 OSTEOPOROSIS, POST-MENOPAUSAL: ICD-10-CM

## 2019-07-31 DIAGNOSIS — E89.0 HYPOTHYROIDISM FOLLOWING RADIOIODINE THERAPY: ICD-10-CM

## 2019-07-31 DIAGNOSIS — E07.9 THYROID ASSOCIATED OPHTHALMOPATHY: ICD-10-CM

## 2019-08-12 DIAGNOSIS — E89.0 HYPOTHYROIDISM FOLLOWING RADIOIODINE THERAPY: Primary | ICD-10-CM

## 2019-08-12 DIAGNOSIS — E55.9 VITAMIN D DEFICIENCY: ICD-10-CM

## 2019-08-15 ENCOUNTER — LAB (OUTPATIENT)
Dept: ENDOCRINOLOGY | Age: 59
End: 2019-08-15

## 2019-08-15 DIAGNOSIS — E89.0 HYPOTHYROIDISM FOLLOWING RADIOIODINE THERAPY: ICD-10-CM

## 2019-08-15 DIAGNOSIS — E55.9 VITAMIN D DEFICIENCY: ICD-10-CM

## 2019-08-24 LAB
25(OH)D3+25(OH)D2 SERPL-MCNC: 43.2 NG/ML (ref 30–100)
ALBUMIN SERPL-MCNC: 4.6 G/DL (ref 3.5–5.2)
ALBUMIN/GLOB SERPL: 1.9 G/DL
ALP SERPL-CCNC: 89 U/L (ref 39–117)
ALT SERPL-CCNC: 25 U/L (ref 1–33)
AST SERPL-CCNC: 26 U/L (ref 1–32)
BILIRUB SERPL-MCNC: 0.3 MG/DL (ref 0.2–1.2)
BUN SERPL-MCNC: 7 MG/DL (ref 6–20)
BUN/CREAT SERPL: 12.5 (ref 7–25)
CALCIUM SERPL-MCNC: 9.3 MG/DL (ref 8.6–10.5)
CHLORIDE SERPL-SCNC: 86 MMOL/L (ref 98–107)
CHOLEST SERPL-MCNC: 165 MG/DL (ref 0–200)
CO2 SERPL-SCNC: 22.5 MMOL/L (ref 22–29)
CREAT SERPL-MCNC: 0.56 MG/DL (ref 0.57–1)
FT4I SERPL CALC-MCNC: 2.3 (ref 1.2–4.9)
GLOBULIN SER CALC-MCNC: 2.4 GM/DL
GLUCOSE SERPL-MCNC: 81 MG/DL (ref 65–99)
HDLC SERPL-MCNC: 100 MG/DL (ref 40–60)
INTERPRETATION: NORMAL
LDLC SERPL CALC-MCNC: 52 MG/DL (ref 0–100)
POTASSIUM SERPL-SCNC: 4 MMOL/L (ref 3.5–5.2)
PROT SERPL-MCNC: 7 G/DL (ref 6–8.5)
SODIUM SERPL-SCNC: 126 MMOL/L (ref 136–145)
T3FREE SERPL-MCNC: 2 PG/ML (ref 2–4.4)
T3RU NFR SERPL: 32 % (ref 24–39)
T4 FREE SERPL-MCNC: 1.85 NG/DL (ref 0.93–1.7)
T4 SERPL-MCNC: 7.3 UG/DL (ref 4.5–12)
THYROGLOB AB SERPL-ACNC: 3.7 IU/ML
THYROGLOB SERPL-MCNC: 2.8 NG/ML
THYROGLOB SERPL-MCNC: ABNORMAL NG/ML
TRIGL SERPL-MCNC: 67 MG/DL (ref 0–150)
TSH SERPL DL<=0.005 MIU/L-ACNC: 0.67 UIU/ML (ref 0.45–4.5)
VLDLC SERPL CALC-MCNC: 13.4 MG/DL

## 2019-08-26 ENCOUNTER — OFFICE VISIT (OUTPATIENT)
Dept: ENDOCRINOLOGY | Age: 59
End: 2019-08-26

## 2019-08-26 VITALS
HEIGHT: 64 IN | WEIGHT: 128 LBS | SYSTOLIC BLOOD PRESSURE: 122 MMHG | BODY MASS INDEX: 21.85 KG/M2 | DIASTOLIC BLOOD PRESSURE: 72 MMHG

## 2019-08-26 DIAGNOSIS — H05.89 THYROID ASSOCIATED OPHTHALMOPATHY: ICD-10-CM

## 2019-08-26 DIAGNOSIS — E07.9 THYROID ASSOCIATED OPHTHALMOPATHY: ICD-10-CM

## 2019-08-26 DIAGNOSIS — E89.0 HYPOTHYROIDISM FOLLOWING RADIOIODINE THERAPY: Primary | ICD-10-CM

## 2019-08-26 PROCEDURE — 99213 OFFICE O/P EST LOW 20 MIN: CPT | Performed by: NURSE PRACTITIONER

## 2019-08-26 RX ORDER — LEVOTHYROXINE SODIUM 112 UG/1
112 TABLET ORAL DAILY
Qty: 90 TABLET | Refills: 3 | Status: SHIPPED | OUTPATIENT
Start: 2019-08-26 | End: 2020-09-01

## 2019-08-26 RX ORDER — GABAPENTIN 100 MG/1
100 CAPSULE ORAL 3 TIMES DAILY
COMMUNITY
End: 2022-01-13

## 2019-08-26 RX ORDER — HYDROCODONE BITARTRATE AND ACETAMINOPHEN 5; 325 MG/1; MG/1
1 TABLET ORAL EVERY 6 HOURS PRN
COMMUNITY
End: 2022-01-13

## 2019-08-26 NOTE — PROGRESS NOTES
"Venkatesh Sawyer is a 59 y.o. female is here today for follow-up.  Chief Complaint   Patient presents with   • Hypothyroidism     recent labs     /72   Ht 162.6 cm (64\")   Wt 58.1 kg (128 lb)   BMI 21.97 kg/m²   Current Outpatient Medications on File Prior to Visit   Medication Sig   • albuterol (PROAIR RESPICLICK) 108 (90 BASE) MCG/ACT inhaler Inhale Every 4 (Four) Hours As Needed for Wheezing.   • aspirin 81 MG tablet Take 81 mg by mouth daily.   • bumetanide (BUMEX) 2 MG tablet Take 2 mg by mouth daily.   • Fluticasone Furoate-Vilanterol (BREO ELLIPTA) 100-25 MCG/INH aerosol powder  Inhale.   • gabapentin (NEURONTIN) 100 MG capsule Take 100 mg by mouth 3 (Three) Times a Day.   • HYDROcodone-acetaminophen (NORCO) 5-325 MG per tablet Take 1 tablet by mouth Every 6 (Six) Hours As Needed.   • levothyroxine (SYNTHROID) 112 MCG tablet Take 1 tablet by mouth Daily. On empty stomach   • losartan (COZAAR) 25 MG tablet Take 25 mg by mouth Daily.   • metoprolol succinate XL (TOPROL-XL) 50 MG 24 hr tablet Take 50 mg by mouth 2 (two) times a day.   • montelukast (SINGULAIR) 10 MG tablet Take 10 mg by mouth Every Night.   • sertraline (ZOLOFT) 100 MG tablet Take 100 mg by mouth Daily.   • Umeclidinium Bromide (INCRUSE ELLIPTA) 62.5 MCG/INH aerosol powder  Inhale.     No current facility-administered medications on file prior to visit.      Family History   Problem Relation Age of Onset   • Thyroid disease Mother    • Diabetes Father    • Hypertension Father      Social History     Tobacco Use   • Smoking status: Current Every Day Smoker   Substance Use Topics   • Alcohol use: No   • Drug use: Not on file     No Known Allergies      History of Present Illness  Encounter Diagnoses   Name Primary?   • Hypothyroidism following radioiodine therapy Yes   • Thyroid associated ophthalmopathy    59-year-old female patient here today for a follow-up visit.  She is being seen for the above diagnoses.  She is taking her " thyroid medication daily consistently as prescribed.  She denies any signs and symptoms of hyper or hypothyroidism.  She does have complaints of chronic fatigue.      The following portions of the patient's history were reviewed and updated as appropriate: allergies, current medications, past family history, past medical history, past social history, past surgical history and problem list.    Review of Systems   Constitutional: Positive for fatigue.   Eyes: Negative.    Cardiovascular: Negative.    Endocrine: Negative.    Musculoskeletal: Positive for gait problem.   Skin: Negative.        Objective   Physical Exam   Constitutional: She is oriented to person, place, and time. She appears well-developed and well-nourished. No distress.   Chronic the ill and tired looking.   HENT:   Head: Normocephalic and atraumatic.   Right Ear: External ear normal.   Left Ear: External ear normal.   Nose: Nose normal.   Mouth/Throat: Oropharynx is clear and moist. No oropharyngeal exudate.   Eyes: Conjunctivae and EOM are normal. Pupils are equal, round, and reactive to light. Right eye exhibits no discharge. Left eye exhibits no discharge. No scleral icterus.   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   Neck: Normal range of motion. Neck supple. No JVD present. No tracheal deviation present. No thyromegaly present.   Cardiovascular: Normal rate, regular rhythm, normal heart sounds and intact distal pulses. Exam reveals no gallop and no friction rub.   No murmur heard.  Pulmonary/Chest: Effort normal and breath sounds normal. No stridor. No respiratory distress. She has no wheezes. She has no rales. She exhibits no tenderness.   Abdominal: Soft. Bowel sounds are normal. She exhibits no distension and no mass. There is no tenderness. There is no rebound and no guarding. No hernia.   Musculoskeletal: Normal range of motion. She exhibits no edema, tenderness or deformity.   Lymphadenopathy:     She has no cervical  adenopathy.   Neurological: She is alert and oriented to person, place, and time. She has normal reflexes. She displays normal reflexes. No cranial nerve deficit or sensory deficit. She exhibits normal muscle tone. Coordination normal.   Skin: Skin is warm and dry. No rash noted. She is not diaphoretic. No erythema. No pallor.   Psychiatric: She has a normal mood and affect. Her behavior is normal. Judgment and thought content normal.   Nursing note and vitals reviewed.    Results for orders placed or performed in visit on 08/15/19   Comprehensive Thyroglobulin   Result Value Ref Range    Thyroglobulin Ab 3.7 (H) IU/mL    Thyroglobulin Comment ng/mL    Thyroglobulin (TG-CHAY) 2.8 ng/mL   Thyroid Panel With TSH   Result Value Ref Range    TSH 0.667 0.450 - 4.500 uIU/mL    T4, Total 7.3 4.5 - 12.0 ug/dL    T3 Uptake 32 24 - 39 %    Free Thyroxine Index 2.3 1.2 - 4.9   T4, Free   Result Value Ref Range    Free T4 1.85 (H) 0.93 - 1.70 ng/dL   T3, Free   Result Value Ref Range    T3, Free 2.0 2.0 - 4.4 pg/mL   Vitamin D 25 Hydroxy   Result Value Ref Range    25 Hydroxy, Vitamin D 43.2 30.0 - 100.0 ng/ml   Lipid Panel   Result Value Ref Range    Total Cholesterol 165 0 - 200 mg/dL    Triglycerides 67 0 - 150 mg/dL    HDL Cholesterol 100 (H) 40 - 60 mg/dL    VLDL Cholesterol 13.4 mg/dL    LDL Cholesterol  52 0 - 100 mg/dL   Comprehensive Metabolic Panel   Result Value Ref Range    Glucose 81 65 - 99 mg/dL    BUN 7 6 - 20 mg/dL    Creatinine 0.56 (L) 0.57 - 1.00 mg/dL    eGFR Non African Am 111 >60 mL/min/1.73    eGFR African Am 134 >60 mL/min/1.73    BUN/Creatinine Ratio 12.5 7.0 - 25.0    Sodium 126 (L) 136 - 145 mmol/L    Potassium 4.0 3.5 - 5.2 mmol/L    Chloride 86 (L) 98 - 107 mmol/L    Total CO2 22.5 22.0 - 29.0 mmol/L    Calcium 9.3 8.6 - 10.5 mg/dL    Total Protein 7.0 6.0 - 8.5 g/dL    Albumin 4.60 3.50 - 5.20 g/dL    Globulin 2.4 gm/dL    A/G Ratio 1.9 g/dL    Total Bilirubin 0.3 0.2 - 1.2 mg/dL    Alkaline  Phosphatase 89 39 - 117 U/L    AST (SGOT) 26 1 - 32 U/L    ALT (SGPT) 25 1 - 33 U/L   Cardiovascular Risk Assessment   Result Value Ref Range    Interpretation Note          Assessment/Plan   Problems Addressed this Visit        Endocrine    Hypothyroidism following radioiodine therapy - Primary    Relevant Medications    levothyroxine (SYNTHROID) 112 MCG tablet       Other    Thyroid associated ophthalmopathy          In Summary, patient was seen and examined.  Clinically metabolically she is stable.  She will continue on her current dose of levothyroxine.  She will follow-up in 1 year.  Prescription refill has been sent to her pharmacy.  She is been encouraged to contact the office should she have any questions or concerns prior to her next visit.  She will follow-up with Dr. Armstrong or myself with labs prior

## 2020-08-12 ENCOUNTER — LAB (OUTPATIENT)
Dept: ENDOCRINOLOGY | Age: 60
End: 2020-08-12

## 2020-08-12 DIAGNOSIS — E89.0 HYPOTHYROIDISM FOLLOWING RADIOIODINE THERAPY: ICD-10-CM

## 2020-08-12 DIAGNOSIS — E05.90 HYPERTHYROIDISM: ICD-10-CM

## 2020-08-12 DIAGNOSIS — E05.00 FLAJANI DISEASE: ICD-10-CM

## 2020-08-12 DIAGNOSIS — E55.9 VITAMIN D DEFICIENCY: ICD-10-CM

## 2020-08-12 DIAGNOSIS — E05.00 FLAJANI DISEASE: Primary | ICD-10-CM

## 2020-08-18 LAB
25(OH)D3+25(OH)D2 SERPL-MCNC: 43.8 NG/ML (ref 30–100)
ALBUMIN SERPL-MCNC: 4.5 G/DL (ref 3.5–5.2)
ALBUMIN/GLOB SERPL: 2 G/DL
ALP SERPL-CCNC: 117 U/L (ref 39–117)
ALT SERPL-CCNC: 18 U/L (ref 1–33)
AST SERPL-CCNC: 26 U/L (ref 1–32)
BILIRUB SERPL-MCNC: 0.3 MG/DL (ref 0–1.2)
BUN SERPL-MCNC: 8 MG/DL (ref 8–23)
BUN/CREAT SERPL: 12.5 (ref 7–25)
CALCIUM SERPL-MCNC: 9.2 MG/DL (ref 8.6–10.5)
CHLORIDE SERPL-SCNC: 88 MMOL/L (ref 98–107)
CHOLEST SERPL-MCNC: 165 MG/DL (ref 0–200)
CO2 SERPL-SCNC: 24.3 MMOL/L (ref 22–29)
CREAT SERPL-MCNC: 0.64 MG/DL (ref 0.57–1)
GLOBULIN SER CALC-MCNC: 2.2 GM/DL
GLUCOSE SERPL-MCNC: 81 MG/DL (ref 65–99)
HDLC SERPL-MCNC: 100 MG/DL (ref 40–60)
INTERPRETATION: NORMAL
LDLC SERPL CALC-MCNC: 51 MG/DL (ref 0–100)
POTASSIUM SERPL-SCNC: 4.5 MMOL/L (ref 3.5–5.2)
PROT SERPL-MCNC: 6.7 G/DL (ref 6–8.5)
SODIUM SERPL-SCNC: 126 MMOL/L (ref 136–145)
T3FREE SERPL-MCNC: 2.3 PG/ML (ref 2–4.4)
T4 FREE SERPL-MCNC: 1.83 NG/DL (ref 0.93–1.7)
T4 SERPL-MCNC: 6.48 MCG/DL (ref 4.5–11.7)
THYROGLOB AB SERPL-ACNC: 4 IU/ML
THYROGLOB SERPL-MCNC: 3.9 NG/ML
THYROGLOB SERPL-MCNC: ABNORMAL NG/ML
TRIGL SERPL-MCNC: 68 MG/DL (ref 0–150)
TSH SERPL DL<=0.005 MIU/L-ACNC: 0.29 UIU/ML (ref 0.27–4.2)
URATE SERPL-MCNC: 4.2 MG/DL (ref 2.4–5.7)
VLDLC SERPL CALC-MCNC: 13.6 MG/DL

## 2020-09-01 RX ORDER — LEVOTHYROXINE SODIUM 112 UG/1
TABLET ORAL
Qty: 90 TABLET | Refills: 2 | Status: SHIPPED | OUTPATIENT
Start: 2020-09-01 | End: 2021-06-14 | Stop reason: SDUPTHER

## 2020-09-08 RX ORDER — LEVOTHYROXINE SODIUM 112 UG/1
TABLET ORAL
Qty: 90 TABLET | Refills: 2 | OUTPATIENT
Start: 2020-09-08

## 2021-06-09 RX ORDER — LEVOTHYROXINE SODIUM 112 UG/1
112 TABLET ORAL DAILY
Qty: 30 TABLET | Refills: 0 | OUTPATIENT
Start: 2021-06-09

## 2021-06-14 RX ORDER — LEVOTHYROXINE SODIUM 112 UG/1
112 TABLET ORAL DAILY
Qty: 90 TABLET | Refills: 1 | Status: SHIPPED | OUTPATIENT
Start: 2021-06-14 | End: 2021-07-14

## 2021-06-14 NOTE — TELEPHONE ENCOUNTER
PT WAS JULIANA GALINDO    SCHEDULED FOR CAN IN July    WASN'T ABLE TO COME DURING  COVID      NEEDS SCRIPT FOR LEVOTHYROXINE    SEND TO [facility]   [address]

## 2021-07-13 ENCOUNTER — OFFICE VISIT (OUTPATIENT)
Dept: ENDOCRINOLOGY | Age: 61
End: 2021-07-13

## 2021-07-13 VITALS
BODY MASS INDEX: 22.57 KG/M2 | WEIGHT: 132.2 LBS | DIASTOLIC BLOOD PRESSURE: 72 MMHG | SYSTOLIC BLOOD PRESSURE: 126 MMHG | HEIGHT: 64 IN

## 2021-07-13 DIAGNOSIS — E05.90 HYPERTHYROIDISM: Primary | ICD-10-CM

## 2021-07-13 DIAGNOSIS — E05.00 GRAVES' EYE DISEASE: Primary | ICD-10-CM

## 2021-07-13 DIAGNOSIS — E89.0 HYPOTHYROIDISM FOLLOWING RADIOIODINE THERAPY: ICD-10-CM

## 2021-07-13 PROCEDURE — 99214 OFFICE O/P EST MOD 30 MIN: CPT | Performed by: NURSE PRACTITIONER

## 2021-07-13 RX ORDER — MELOXICAM 7.5 MG/1
TABLET ORAL
COMMUNITY
Start: 2021-06-21

## 2021-07-13 RX ORDER — METOPROLOL TARTRATE 50 MG/1
TABLET, FILM COATED ORAL
COMMUNITY
Start: 2021-04-19

## 2021-07-13 RX ORDER — ONDANSETRON 4 MG/1
TABLET, FILM COATED ORAL
COMMUNITY
Start: 2021-04-22

## 2021-07-13 RX ORDER — LOSARTAN POTASSIUM 50 MG/1
TABLET ORAL
COMMUNITY
Start: 2021-04-19

## 2021-07-13 NOTE — PROGRESS NOTES
"Chief Complaint  Hypothyroidism (energy level is very low )    Subjective          Valentine Sawyer presents to [facility] ENDOCRINOLOGY  History of Present Illness     Hypothyroid diagnosed s/p NEGRETE 12/2013  +Graves with opthalmopathy, sees specialist  Denies Dysphagia or voice changes  + Chest pain & palpitations but \" my heart doctors is taking care of CHF\" also has \" lung issue\"  + Fatigue  Denies Weight changes  Denies Hair loss  + dry skin \" rissa got the graves skin\"  Denies Diarrhea or constipation  On levothyroxine 112mcg daily   Lab Results   Component Value Date    TSH 0.292 08/12/2020         Objective   Vital Signs:   /72 (BP Location: Right arm, Patient Position: Sitting, Cuff Size: Adult)   Ht 162.6 cm (64.02\")   Wt 60 kg (132 lb 3.2 oz)   BMI 22.68 kg/m²     Physical Exam  Vitals reviewed.   Constitutional:       General: She is not in acute distress.  HENT:      Head: Normocephalic and atraumatic.   Eyes:      Comments: orbitopathy    Cardiovascular:      Rate and Rhythm: Normal rate and regular rhythm.   Pulmonary:      Effort: Pulmonary effort is normal. No respiratory distress.   Musculoskeletal:         General: No signs of injury. Normal range of motion.      Cervical back: Normal range of motion and neck supple.   Skin:     General: Skin is warm and dry.   Neurological:      Mental Status: She is alert and oriented to person, place, and time. Mental status is at baseline.   Psychiatric:         Mood and Affect: Mood normal.         Behavior: Behavior normal.         Thought Content: Thought content normal.         Judgment: Judgment normal.          Result Review :   The following data was reviewed by: AVA Santana on 07/13/2021:  Common labs    Common Labsle 8/12/20 8/12/20 8/12/20    1130 1130 1130   Glucose   81   BUN   8   Creatinine   0.64   eGFR Non  Am   95   eGFR African Am   115   Sodium   126 (A)   Potassium   4.5   Chloride   88 (A)   Calcium   9.2 "   Total Protein   6.7   Albumin   4.50   Total Bilirubin   0.3   Alkaline Phosphatase   117   AST (SGOT)   26   ALT (SGPT)   18   Total Cholesterol 165     Triglycerides 68     HDL Cholesterol 100 (A)     LDL Cholesterol  51     Uric Acid  4.2    (A) Abnormal value                      Assessment and Plan    Diagnoses and all orders for this visit:    1. Hyperthyroidism (Primary)  -     TSH; Future  -     T4, Free; Future  -     T3, Free; Future    2. Hypothyroidism following radioiodine therapy  -     TSH  -     T4, Free  -     T3, Free  -     TSH; Future  -     T4, Free; Future  -     T3, Free; Future        Follow Up   Return in about 6 months (around 1/13/2022).     Try to get new eye doctor  Continue current t4 dose  Needs close monitoring to reduce risk of complications from over or under treatment with thyroid hormone replacement      Patient was given instructions and counseling regarding her condition or for health maintenance advice. Please see specific information pulled into the AVS if appropriate.     AVA Santana

## 2021-07-14 DIAGNOSIS — E89.0 HYPOTHYROIDISM FOLLOWING RADIOIODINE THERAPY: Primary | ICD-10-CM

## 2021-07-14 LAB
T3FREE SERPL-MCNC: 3.2 PG/ML (ref 2–4.4)
T4 FREE SERPL-MCNC: 2.74 NG/DL (ref 0.93–1.7)
TSH SERPL DL<=0.005 MIU/L-ACNC: 0.01 UIU/ML (ref 0.27–4.2)

## 2021-07-14 RX ORDER — LEVOTHYROXINE SODIUM 0.1 MG/1
100 TABLET ORAL DAILY
Qty: 30 TABLET | Refills: 5 | Status: SHIPPED | OUTPATIENT
Start: 2021-07-14 | End: 2022-05-24

## 2021-07-14 NOTE — PROGRESS NOTES
Appear thyroid dose is too much currently.  Will decrease dose to 100mcg and recheck labs in 2 months. Too much thyroid medication can cause heart arrythmias

## 2022-01-13 ENCOUNTER — OFFICE VISIT (OUTPATIENT)
Dept: ENDOCRINOLOGY | Age: 62
End: 2022-01-13

## 2022-01-13 DIAGNOSIS — E89.0 HYPOTHYROIDISM FOLLOWING RADIOIODINE THERAPY: Primary | ICD-10-CM

## 2022-01-13 DIAGNOSIS — R73.01 IMPAIRED FASTING BLOOD SUGAR: ICD-10-CM

## 2022-01-13 DIAGNOSIS — E05.00 GRAVES' EYE DISEASE: ICD-10-CM

## 2022-01-13 DIAGNOSIS — E55.9 VITAMIN D DEFICIENCY: ICD-10-CM

## 2022-01-13 PROCEDURE — 99214 OFFICE O/P EST MOD 30 MIN: CPT | Performed by: INTERNAL MEDICINE

## 2022-01-13 NOTE — PROGRESS NOTES
Chief Complaint  Chief Complaint   Patient presents with   • Hyperthyroidism   FOLLOW UP/ HYPERTHYROIDISM    Subjective          History of Present Illness    Valentine Sawyer 61 y.o. presents as a F/u patient for the evaluation of Hypothyroidism.    Diagnosed in 2013 - s/p radiation. And now hypothyroid.     Today in clinic pt reports that she is on levothyroxine 100 mcg oral daily, takes daily and is on empty stomach.     She complains of feeling tired, reports feeling stressed and lack of sleep. Weight has been stable.   No c/o hair loss, no increased sweating, some dry skin, sleep is disturbed. No c/o heat intolerance and no cold intolerance.   No c/o tremors. Does have hx of CHF and prior hx of afib.  Denied c/o difficulty breathing, swallowing and change in voice.   No family hx of thyroid disease.     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       Reviewed primary care physician's/consulting physician documentation and lab results     You have chosen to receive care through a telephone visit. Do you consent to use a telephone visit for your medical care today? Yes        I have reviewed the patient's allergies, medicines, past medical hx, family hx and social hx in detail.    Objective   Vital Signs:    There were no vitals taken for this visit. - telehealth visit  Physical Exam   General appearance-pleasant, no distress  Respiratory-normal breathing appreciated.  No respiratory distress noted  Ear nose and throat-no hard of hearing.  Neurological assessment-alert and oriented x3.    Result Review :     Office Visit on 07/13/2021   Component Date Value Ref Range Status   • TSH 07/13/2021 0.014* 0.270 - 4.200 uIU/mL Final   • Free T4 07/13/2021 2.74* 0.93 - 1.70 ng/dL Final    Results may be falsely increased if patient taking Biotin.   • T3, Free 07/13/2021 3.2  2.0 - 4.4 pg/mL Final     Data reviewed: [place] document       Results Review:    I reviewed the patient's new clinical results.     Assessment and Plan    Problem  List Items Addressed This Visit        Other    Hypothyroidism following radioiodine therapy - Primary    Relevant Orders    TSH    T3, Free    T4, Free    Basic Metabolic Panel    Hemoglobin A1c    Lipid Panel    Vitamin D 25 Hydroxy    Ambulatory Referral to Ophthalmology    TSH    T4, Free    T3, Free    Basic Metabolic Panel    Hemoglobin A1c    Lipid Panel    Vitamin D 25 Hydroxy    Vitamin B12 & Folate      Other Visit Diagnoses     Graves' eye disease        Relevant Orders    TSH    T3, Free    T4, Free    Basic Metabolic Panel    Hemoglobin A1c    Lipid Panel    Vitamin D 25 Hydroxy    Ambulatory Referral to Ophthalmology    TSH    T4, Free    T3, Free    Basic Metabolic Panel    Hemoglobin A1c    Lipid Panel    Vitamin D 25 Hydroxy    Vitamin B12 & Folate    Vitamin D deficiency        Relevant Orders    TSH    T3, Free    T4, Free    Basic Metabolic Panel    Hemoglobin A1c    Lipid Panel    Vitamin D 25 Hydroxy    Ambulatory Referral to Ophthalmology    TSH    T4, Free    T3, Free    Basic Metabolic Panel    Hemoglobin A1c    Lipid Panel    Vitamin D 25 Hydroxy    Vitamin B12 & Folate    Impaired fasting blood sugar         Relevant Orders    Hemoglobin A1c    Hemoglobin A1c        Hypothyroidism-post ablative  Check thyroid levels  Patient reports that she has not been vaccinated for COVID-vaccine, and as a result is concerned about coming out of the house for the blood work-up.    Explained to the patient the importance of getting the vaccine.    Graves orbitopathy  Refer the patient to Dr. Chaudhari.    I spent 32 minutes caring for Valentine on this date of service. This time includes time spent by me in the following activities:preparing for the visit, reviewing tests, obtaining and/or reviewing a separately obtained history, counseling and educating the patient/family/caregiver, ordering medications, tests, or procedures, referring and communicating with other health care professionals , documenting  "information in the medical record, independently interpreting results and communicating that information with the patient/family/caregiver and care coordination          Interpreted the blood work-up/imaging results performed by the primary care/consulting physician -    Refills sent to pharmacy    Follow Up     Patient was given instructions and counseling regarding her condition or for health maintenance advice. Please see specific information pulled into the AVS if appropriate.       Thank you for asking me to see your patient, Valentine Sawyer in consultation.         Leroy Avery MD  01/13/22      EMR Dragon / transcription disclaimer:     \"Dictated utilizing Dragon dictation\".              "

## 2022-05-23 DIAGNOSIS — E89.0 HYPOTHYROIDISM FOLLOWING RADIOIODINE THERAPY: ICD-10-CM

## 2022-05-24 RX ORDER — LEVOTHYROXINE SODIUM 0.1 MG/1
TABLET ORAL
Qty: 30 TABLET | Refills: 0 | Status: SHIPPED | OUTPATIENT
Start: 2022-05-24 | End: 2022-07-11

## 2022-05-24 NOTE — TELEPHONE ENCOUNTER
Please get patient in for labs as she never repeated the labs as requested 2 months after the change in July

## 2022-05-24 NOTE — TELEPHONE ENCOUNTER
Rx Refill Note  Requested Prescriptions     Pending Prescriptions Disp Refills    levothyroxine (SYNTHROID, LEVOTHROID) 100 MCG tablet [Pharmacy Med Name: LEVOTHYROXINE 100 MCG TABLET] 30 tablet 5     Sig: TAKE ONE TABLET BY MOUTH DAILY      Last office visit with prescribing clinician: 7/13/2021      Next office visit with prescribing clinician: 3 months           Tere Cano  05/24/22, 11:17 EDT

## 2022-06-15 ENCOUNTER — LAB (OUTPATIENT)
Dept: ENDOCRINOLOGY | Age: 62
End: 2022-06-15

## 2022-06-15 DIAGNOSIS — E05.00 GRAVES' EYE DISEASE: ICD-10-CM

## 2022-06-15 DIAGNOSIS — R73.01 IMPAIRED FASTING BLOOD SUGAR: ICD-10-CM

## 2022-06-15 DIAGNOSIS — E89.0 HYPOTHYROIDISM FOLLOWING RADIOIODINE THERAPY: ICD-10-CM

## 2022-06-15 DIAGNOSIS — E55.9 VITAMIN D DEFICIENCY: ICD-10-CM

## 2022-06-16 LAB
25(OH)D3+25(OH)D2 SERPL-MCNC: 34.8 NG/ML (ref 30–100)
BUN SERPL-MCNC: 6 MG/DL (ref 8–27)
BUN/CREAT SERPL: 9 (ref 12–28)
CALCIUM SERPL-MCNC: 9.2 MG/DL (ref 8.7–10.3)
CHLORIDE SERPL-SCNC: 84 MMOL/L (ref 96–106)
CHOLEST SERPL-MCNC: 155 MG/DL (ref 100–199)
CO2 SERPL-SCNC: 21 MMOL/L (ref 20–29)
CREAT SERPL-MCNC: 0.7 MG/DL (ref 0.57–1)
EGFRCR SERPLBLD CKD-EPI 2021: 98 ML/MIN/1.73
FOLATE SERPL-MCNC: 10.5 NG/ML
GLUCOSE SERPL-MCNC: 72 MG/DL (ref 65–99)
HBA1C MFR BLD: 4.7 % (ref 4.8–5.6)
HDLC SERPL-MCNC: 83 MG/DL
IMP & REVIEW OF LAB RESULTS: NORMAL
LDLC SERPL CALC-MCNC: 55 MG/DL (ref 0–99)
POTASSIUM SERPL-SCNC: 4.2 MMOL/L (ref 3.5–5.2)
SODIUM SERPL-SCNC: 122 MMOL/L (ref 134–144)
T3FREE SERPL-MCNC: 2.2 PG/ML (ref 2–4.4)
T4 FREE SERPL-MCNC: 2.1 NG/DL (ref 0.82–1.77)
TRIGL SERPL-MCNC: 91 MG/DL (ref 0–149)
TSH SERPL DL<=0.005 MIU/L-ACNC: 0.96 UIU/ML (ref 0.45–4.5)
VIT B12 SERPL-MCNC: 308 PG/ML (ref 232–1245)
VLDLC SERPL CALC-MCNC: 17 MG/DL (ref 5–40)

## 2022-07-09 DIAGNOSIS — E89.0 HYPOTHYROIDISM FOLLOWING RADIOIODINE THERAPY: ICD-10-CM

## 2022-07-12 RX ORDER — LEVOTHYROXINE SODIUM 0.1 MG/1
TABLET ORAL
Qty: 90 TABLET | Refills: 0 | Status: SHIPPED | OUTPATIENT
Start: 2022-07-12 | End: 2022-12-12 | Stop reason: SDUPTHER

## 2022-12-12 DIAGNOSIS — E89.0 HYPOTHYROIDISM FOLLOWING RADIOIODINE THERAPY: ICD-10-CM

## 2022-12-12 RX ORDER — LEVOTHYROXINE SODIUM 0.1 MG/1
100 TABLET ORAL DAILY
Qty: 90 TABLET | Refills: 0 | Status: SHIPPED | OUTPATIENT
Start: 2022-12-12 | End: 2023-02-02 | Stop reason: SDUPTHER

## 2022-12-12 NOTE — TELEPHONE ENCOUNTER
Rx Refill Note  Requested Prescriptions     Pending Prescriptions Disp Refills   • levothyroxine (SYNTHROID, LEVOTHROID) 100 MCG tablet 90 tablet 0     Sig: Take 1 tablet by mouth Daily.      Last office visit with prescribing clinician: Visit date not found   Last telemedicine visit with prescribing clinician: Visit date not found   Next office visit with prescribing clinician: Visit date not found   {TIP  Encounters:23}                      Would you like a call back once the refill request has been completed: [] Yes [] No    If the office needs to give you a call back, can they leave a voicemail: [] Yes [] No    Vera Benitez MA  12/12/22, 13:20 EST

## 2023-02-02 ENCOUNTER — TELEPHONE (OUTPATIENT)
Dept: ENDOCRINOLOGY | Age: 63
End: 2023-02-02
Payer: MEDICARE

## 2023-02-02 DIAGNOSIS — E89.0 HYPOTHYROIDISM FOLLOWING RADIOIODINE THERAPY: ICD-10-CM

## 2023-02-02 RX ORDER — LEVOTHYROXINE SODIUM 0.1 MG/1
100 TABLET ORAL DAILY
Qty: 30 TABLET | Refills: 0 | Status: SHIPPED | OUTPATIENT
Start: 2023-02-02

## 2023-02-02 NOTE — TELEPHONE ENCOUNTER
PT CALLED NEEDING A MED REFILL OF THE BELOW MED. PT HAS MAD AN APPOINTMENT FOR 3/17/22.    levothyroxine (SYNTHROID, LEVOTHROID) 100 MCG tablet [9859]    GOING TO:    [facility] [id number] - [place], [place] - [address] [place] [address] AT [facility] - [phone] [facility] [phone] FX

## 2023-03-17 ENCOUNTER — OFFICE VISIT (OUTPATIENT)
Dept: ENDOCRINOLOGY | Age: 63
End: 2023-03-17
Payer: MEDICARE

## 2023-03-17 VITALS
BODY MASS INDEX: 25 KG/M2 | SYSTOLIC BLOOD PRESSURE: 100 MMHG | TEMPERATURE: 96.8 F | WEIGHT: 146.4 LBS | DIASTOLIC BLOOD PRESSURE: 60 MMHG | HEART RATE: 63 BPM | OXYGEN SATURATION: 98 % | HEIGHT: 64 IN

## 2023-03-17 DIAGNOSIS — E89.0 HYPOTHYROIDISM FOLLOWING RADIOIODINE THERAPY: Primary | ICD-10-CM

## 2023-03-17 PROCEDURE — 99213 OFFICE O/P EST LOW 20 MIN: CPT | Performed by: NURSE PRACTITIONER

## 2023-03-17 RX ORDER — TIOTROPIUM BROMIDE AND OLODATEROL 3.124; 2.736 UG/1; UG/1
2 SPRAY, METERED RESPIRATORY (INHALATION) DAILY
COMMUNITY
Start: 2023-02-09

## 2023-03-17 NOTE — PROGRESS NOTES
"Chief Complaint  Hyperthyroidism (Patient has been feeling fatigue /Today's weight is a little high./Hx of hyperthyroid in family:x )    Subjective        Valentine Sawyer presents to [facility] ENDOCRINOLOGY  History of Present Illness     LOV 1/2022    Was off medication for a few months  Back on medication for 4-6 weeks     Diagnosed in 2013 - s/p radiation. And now hypothyroid.   Currently on levothyroxine 100mcg daily   Sleeping- \" I don't sleep\"  Heart: denies CP and palpitations but does struggle with lung disease   Denies tremors     Graves orbitopathy  Dr. clemens Referred the patient to Dr. Chaudhari but she did not go because the appointment was downtown       Objective   Vital Signs:  /60   Pulse 63   Temp 96.8 °F (36 °C) (Temporal)   Ht 162.6 cm (64\")   Wt 66.4 kg (146 lb 6.4 oz)   SpO2 98%   BMI 25.13 kg/m²   Estimated body mass index is 25.13 kg/m² as calculated from the following:    Height as of this encounter: 162.6 cm (64\").    Weight as of this encounter: 66.4 kg (146 lb 6.4 oz).             Physical Exam  Vitals reviewed.   Constitutional:       General: She is not in acute distress.  HENT:      Head: Normocephalic and atraumatic.   Cardiovascular:      Rate and Rhythm: Normal rate.   Pulmonary:      Effort: Pulmonary effort is normal. No respiratory distress.   Musculoskeletal:         General: No signs of injury. Normal range of motion.      Cervical back: Normal range of motion and neck supple.   Skin:     General: Skin is warm and dry.   Neurological:      Mental Status: She is alert and oriented to person, place, and time. Mental status is at baseline.   Psychiatric:         Mood and Affect: Mood normal.         Behavior: Behavior normal.         Thought Content: Thought content normal.         Judgment: Judgment normal.        Result Review :  The following data was reviewed by: AVA Santana on 03/17/2023:  Common labs    Common Labs 6/15/22 6/15/22 6/15/22 "    1003 1003 1003   Glucose   72   BUN   6 (A)   Creatinine   0.70   Sodium   122 (A)   Potassium   4.2   Chloride   84 (A)   Calcium   9.2   Total Cholesterol 155     Triglycerides 91     HDL Cholesterol 83     LDL Cholesterol  55     Hemoglobin A1C  4.7 (A)    (A) Abnormal value       Comments are available for some flowsheets but are not being displayed.                        Assessment and Plan   Diagnoses and all orders for this visit:    1. Hypothyroidism following radioiodine therapy (Primary)  -     TSH  -     T4, Free  -     T3, Free             Follow Up   No follow-ups on file.     Labs today  Rearrange eye exam     Patient was given instructions and counseling regarding her condition or for health maintenance advice. Please see specific information pulled into the AVS if appropriate.     Rosie Felipe APRN

## 2023-03-18 LAB
T3FREE SERPL-MCNC: 2.8 PG/ML (ref 2–4.4)
T4 FREE SERPL-MCNC: 1.87 NG/DL (ref 0.93–1.7)
TSH SERPL DL<=0.005 MIU/L-ACNC: 1.95 UIU/ML (ref 0.27–4.2)

## 2023-03-20 ENCOUNTER — TELEPHONE (OUTPATIENT)
Dept: ENDOCRINOLOGY | Age: 63
End: 2023-03-20
Payer: MEDICARE

## 2023-03-20 NOTE — TELEPHONE ENCOUNTER
Called and left patient a message to call back.    Okay for  staff to tell patient     Please see if patient can see dr erazo at an office that is not downtown

## 2023-03-21 ENCOUNTER — TELEPHONE (OUTPATIENT)
Dept: ENDOCRINOLOGY | Age: 63
End: 2023-03-21
Payer: MEDICARE

## 2023-03-21 DIAGNOSIS — E89.0 HYPOTHYROIDISM FOLLOWING RADIOIODINE THERAPY: Primary | ICD-10-CM

## 2023-03-21 NOTE — TELEPHONE ENCOUNTER
Okay for front to read, please inform pt that  Labs reviewed, will need to repeat again in 2 months, please arrange with patient  Make sure she is not taking any supplements with biotin at least 1 to 2 weeks before her lab draw. Please set up lab appt if you can.